# Patient Record
Sex: FEMALE | Race: WHITE | URBAN - METROPOLITAN AREA
[De-identification: names, ages, dates, MRNs, and addresses within clinical notes are randomized per-mention and may not be internally consistent; named-entity substitution may affect disease eponyms.]

---

## 2017-11-09 ENCOUNTER — OFFICE VISIT (OUTPATIENT)
Dept: DERMATOLOGY | Facility: AMBULATORY SURGERY CENTER | Age: 62
End: 2017-11-09

## 2017-11-09 VITALS
HEIGHT: 61 IN | HEART RATE: 77 BPM | TEMPERATURE: 98 F | SYSTOLIC BLOOD PRESSURE: 120 MMHG | WEIGHT: 172 LBS | BODY MASS INDEX: 32.47 KG/M2 | OXYGEN SATURATION: 96 % | RESPIRATION RATE: 16 BRPM | DIASTOLIC BLOOD PRESSURE: 78 MMHG

## 2017-11-09 DIAGNOSIS — L57.0 ACTINIC KERATOSIS: Primary | ICD-10-CM

## 2017-11-09 DIAGNOSIS — Z85.828 HISTORY OF NONMELANOMA SKIN CANCER: ICD-10-CM

## 2017-11-09 RX ORDER — INSULIN LISPRO 100 [IU]/ML
INJECTION, SOLUTION INTRAVENOUS; SUBCUTANEOUS
COMMUNITY
Start: 2017-09-05

## 2017-11-09 RX ORDER — ATORVASTATIN CALCIUM 40 MG/1
40 TABLET, FILM COATED ORAL DAILY
COMMUNITY
Start: 2017-09-04

## 2017-11-09 RX ORDER — INSULIN GLARGINE 100 [IU]/ML
INJECTION, SOLUTION SUBCUTANEOUS
COMMUNITY
Start: 2017-09-05

## 2017-11-09 RX ORDER — SPIRONOLACTONE 50 MG/1
TABLET, FILM COATED ORAL
COMMUNITY
Start: 2017-11-01

## 2017-11-09 RX ORDER — METOPROLOL TARTRATE 25 MG/1
25 TABLET, FILM COATED ORAL 2 TIMES DAILY
COMMUNITY
Start: 2017-09-04

## 2017-11-09 RX ORDER — DILTIAZEM HYDROCHLORIDE 180 MG/1
CAPSULE, EXTENDED RELEASE ORAL
COMMUNITY
Start: 2017-11-01

## 2017-11-09 RX ORDER — FLUOROURACIL 50 MG/G
CREAM TOPICAL 2 TIMES DAILY
Qty: 40 G | Refills: 0 | Status: SHIPPED | OUTPATIENT
Start: 2017-11-09 | End: 2019-11-19 | Stop reason: CLARIF

## 2017-11-09 RX ORDER — INSULIN HUMAN 500 [IU]/ML
INJECTION, SOLUTION SUBCUTANEOUS
Refills: 5 | COMMUNITY
Start: 2017-11-03

## 2017-11-09 NOTE — MR AVS SNAPSHOT
Visit Information Date & Time Provider Department Dept. Phone Encounter #  
 11/9/2017 11:30 AM Alan Rice NP [facility] [phone] [id number] Upcoming Health Maintenance Date Due Hepatitis C Screening 1955 DTaP/Tdap/Td series (1 - Tdap) 1/18/1974 PAP AKA CERVICAL CYTOLOGY 1/18/1974 BREAST CANCER SCRN MAMMOGRAM 1/18/2003 FOBT Q 1 YEAR AGE 50-75 1/18/2003 ZOSTER VACCINE AGE 60> 11/18/2012 Influenza Age 5 to Adult 8/1/2017 Allergies as of 11/9/2017  Review Complete On: 11/9/2017 By: Fátima Richards Severity Noted Reaction Type Reactions Insulin Beef  09/25/2012    Unknown (comments) Macrodantin [Nitrofurantoin Macrocrystalline]  09/25/2012    Rash Valium [Diazepam]  09/25/2012    Unknown (comments) Current Immunizations  Never Reviewed No immunizations on file. Not reviewed this visit Vitals BP Pulse Temp Resp Height(growth percentile) Weight(growth percentile) 120/78 (BP 1 Location: Left arm, BP Patient Position: Sitting) 77 98 °F (36.7 °C) (Oral) 16 5' 1\" (1.549 m) 172 lb (78 kg) SpO2 BMI OB Status Smoking Status 96% 32.5 kg/m2 Hysterectomy Never Smoker Vitals History BMI and BSA Data Body Mass Index Body Surface Area 32.5 kg/m 2 1.83 m 2 Preferred Pharmacy Pharmacy Name Phone [address] [facility] [phone] Your Updated Medication List  
  
   
This list is accurate as of: 11/9/17 11:57 AM.  Always use your most recent med list.  
  
  
  
  
 atorvastatin 40 mg tablet Commonly known as:  LIPITOR Take 40 mg by mouth daily. BD INSULIN SYRINGE ULTRA-FINE 1/2 mL 31 gauge x 15/64\" Syrg Generic drug:  insulin syringe-needle U-100 * CARTIA  mg ER capsule Generic drug:  dilTIAZem CD Take  by mouth daily. * CARTIA  mg ER capsule Generic drug:  dilTIAZem CD  
  
 FEMARA 2.5 mg tablet Generic drug:  letrozole Take 2.5 mg by mouth daily. FISH OIL PO Take  by mouth. FOSAMAX 70 mg tablet Generic drug:  alendronate Take  by mouth.  
  
 glimepiride 4 mg tablet Commonly known as:  AMARYL Take  by mouth every morning. HumaLOG KwikPen 100 unit/mL kwikpen Generic drug:  insulin lispro HumaLOG Mix 75-25 100 unit/mL (75-25) injection Generic drug:  insulin lispro protamine/insulin lispro  
by SubCUTAneous route. HumuLIN R U-500 (Conc) Kwikpen 500 unit/mL (3 mL) Inpn subQ pen Generic drug:  insulin U-500 CONCENTRATED regular INJECT 90 UNITS WITH BREAKFAST AND 80 UNITS WITH LUNCH  
  
 LANTUS 100 unit/mL injection Generic drug:  insulin glargine LISINOPRIL-HYDROCHLOROTHIAZIDE PO Take  by mouth.  
  
 metFORMIN 500 mg tablet Commonly known as:  GLUCOPHAGE Take  by mouth daily. METOPROLOL SUCCINATE PO Take  by mouth.  
  
 metoprolol tartrate 25 mg tablet Commonly known as:  LOPRESSOR Take 25 mg by mouth two (2) times a day. MUCINEX DM PO Take  by mouth.  
  
 multivitamin tablet Commonly known as:  ONE A DAY Take 1 Tab by mouth daily. POTASSIUM PO Take  by mouth. * spironolactone 25 mg tablet Commonly known as:  ALDACTONE Take 25 mg by mouth daily. * spironolactone 50 mg tablet Commonly known as:  ALDACTONE  
  
 * Notice: This list has 4 medication(s) that are the same as other medications prescribed for you. Read the directions carefully, and ask your doctor or other care provider to review them with you. Patient Instructions Self Skin Exam and Sunscreens Early detection and treatment is essential in the treatment of all forms of skin cancer. If caught early, all forms of skin cancer are curable.   In addition to your regular visits, you should perform a monthly skin examination. Over time, you become familiar with what is normally found on your skin and can identify new or suspicious spots. One of the screening tools you can use to assess your skin is to follow the ABCDEs: 
 
A= Asymmetry (One half is unlike the other half) B= Border (An irregular, scalloped or poorly defined edge) C= Color (Is varied from one area to another, has shades of tan, brown/ black,       white, red or blue) D= Diameter (Spots larger than 6mm or a pencil eraser) E= Evolving (New spots or one that is changing in size, shape, or color) A follow- up interval will be customized based on your history of skin cancer or level of skin damage and risk factors. In any case, if you notice a suspicious or new spot, an appointment should be arranged between regular visits. Everyone should use sunscreen and sun-safe practices, which is especially important for those with a personal or family history of skin cancer. Suggestions for this include: 1. Use daily moisturizers containing SPF 30 or higher. 2. Wear long sleeve clothing with UPF ratings and a broad-brimmed hat. 3. Apply sunscreen with SPF 30 or higher to all sun exposed areas if you are going to be in the sun. A broad spectrum UVA/ UVB sunscreen is best.  Dont forget to REAPPLY every two hours or more often if swimming or sweating! 4. Avoid outside activities during peak sun hours, especially in the summer (10am- 2pm). 5. DO NOT use tanning beds. Using sunscreen and sun-safe practices can help reduce the likelihood of developing skin cancer or additional skin cancers in those previously diagnosed. Introducing [facility]! Dayana Child introduces [facility] patient portal. Now you can access parts of your medical record, email your doctor's office, and request medication refills online. 1. In your internet browser, go to [email or web address] 2. Click on the First Time User? Click Here link in the Sign In box. You will see the New Member Sign Up page. 3. Enter your Kindful Access Code exactly as it appears below. You will not need to use this code after youve completed the sign-up process. If you do not sign up before the expiration date, you must request a new code. · Kindful Access Code: [id number] [facility] Expires: 2/7/2018 11:57 AM 
 
4. Enter the last four digits of your Social Security Number (xxxx) and Date of Birth (mm/dd/yyyy) as indicated and click Submit. You will be taken to the next sign-up page. 5. Create a Kindful ID. This will be your Kindful login ID and cannot be changed, so think of one that is secure and easy to remember. 6. Create a Kindful password. You can change your password at any time. 7. Enter your Password Reset Question and Answer. This can be used at a later time if you forget your password. 8. Enter your e-mail address. You will receive e-mail notification when new information is available in [address] 9. Click Sign Up. You can now view and download portions of your medical record. 10. Click the Download Summary menu link to download a portable copy of your medical information. If you have questions, please visit the Frequently Asked Questions section of the Kindful website. Remember, Kindful is NOT to be used for urgent needs. For medical emergencies, dial 911. Now available from your iPhone and Android! Please provide this summary of care documentation to your next provider. Your primary care clinician is listed as Apoorva Mujica. If you have any questions after today's visit, please call [phone].

## 2017-11-09 NOTE — PROGRESS NOTES
Chief Complaint   Patient presents with    Skin Exam     1. Have you been to the ER, urgent care clinic since your last visit? Hospitalized since your last visit? No    2. Have you seen or consulted any other health care providers outside of the [address] [place] [address] since your last visit? Include any pap smears or colon screening. Yes, Dr. Zohra Read for a routine check up and Dr. Clau Reid for diabetes.

## 2017-11-09 NOTE — PROGRESS NOTES
Name: Patrick House       Age: 57 y.o. Date: 11/9/2017    Chief Complaint:   Chief Complaint   Patient presents with    Skin Exam       Subjective:    HPI  Ms. Patrick House is a 57 y.o. female who presents for a full skin exam.  The patient's last skin exam was one year ago and the patient does have current complaints related to her skin. She reports dry scaly areas diffusely on her face. One resolved with a mask she used but the others have not. The patient's pertinent skin history includes : SCC of the left upper lip, BCC of the right ala and arm    ROS: Constitutional: Negative. Dermatological : positive for - skin lesion changes      Social History     Social History    Marital status: UNKNOWN     Spouse name: N/A    Number of children: N/A    Years of education: N/A     Occupational History    Not on file. Social History Main Topics    Smoking status: Never Smoker    Smokeless tobacco: Never Used    Alcohol use No    Drug use: Not on file    Sexual activity: Not on file     Other Topics Concern    Not on file     Social History Narrative       Family History   Problem Relation Age of Onset    Other Mother     Heart Disease Father     Cancer Sister     Diabetes Brother        Past Medical History:   Diagnosis Date    Basal cell carcinoma     Endocrine disease     Essential hypertension     Family history of skin cancer     Skin cancer     Squamous cell carcinoma     Sun-damaged skin     Sunburn, blistering        Past Surgical History:   Procedure Laterality Date    AMB POC MOHS 1 STAGE H/N/HF/G      HX CERVICAL FUSION  2000    HX HYSTERECTOMY  2004    HX MASTECTOMY  2009    bilateral    HX MOHS PROCEDURES  1999       Current Outpatient Prescriptions   Medication Sig Dispense Refill    atorvastatin (LIPITOR) 40 mg tablet Take 40 mg by mouth daily.       HUMULIN R U-500, CONC, KWIKPEN 500 unit/mL (3 mL) inpn subQ pen INJECT 90 UNITS WITH BREAKFAST AND 80 UNITS WITH LUNCH  5    metoprolol tartrate (LOPRESSOR) 25 mg tablet Take 25 mg by mouth two (2) times a day.  fluorouracil (EFUDEX) 5 % chemo cream Apply  to affected area two (2) times a day. 40 g 0    spironolactone (ALDACTONE) 25 mg tablet Take 25 mg by mouth daily.  metFORMIN (GLUCOPHAGE) 500 mg tablet Take  by mouth daily.  DOCOSAHEXANOIC ACID/EPA (FISH OIL PO) Take  by mouth.  multivitamin (ONE A DAY) tablet Take 1 Tab by mouth daily.  CARTIA  mg ER capsule       LANTUS 100 unit/mL injection       HUMALOG KWIKPEN 100 unit/mL kwikpen       spironolactone (ALDACTONE) 50 mg tablet       BD INSULIN SYRINGE ULTRA-FINE 1/2 mL 31 gauge x 15/64\" syrg       METOPROLOL SUCCINATE PO Take  by mouth.  insulin lispro protamine/insulin lispro (HUMALOG MIX 75-25) 100 unit/mL (75-25) injection by SubCUTAneous route.  diltiazem CD (CARTIA XT) 120 mg ER capsule Take  by mouth daily.  GUAIFENESIN/DEXTROMETHORPHAN (MUCINEX DM PO) Take  by mouth.  letrozole (FEMARA) 2.5 mg tablet Take 2.5 mg by mouth daily.  alendronate (FOSAMAX) 70 mg tablet Take  by mouth.  LISINOPRIL-HYDROCHLOROTHIAZIDE PO Take  by mouth.  glimepiride (AMARYL) 4 mg tablet Take  by mouth every morning.  POTASSIUM PO Take  by mouth. Allergies   Allergen Reactions    Insulin Beef Unknown (comments)    Macrodantin [Nitrofurantoin Macrocrystalline] Rash    Valium [Diazepam] Unknown (comments)         Objective:    Visit Vitals    /78 (BP 1 Location: Left arm, BP Patient Position: Sitting)    Pulse 77    Temp 98 °F (36.7 °C) (Oral)    Resp 16    Ht 5' 1\" (1.549 m)    Wt 78 kg (172 lb)    SpO2 96%    BMI 32.5 kg/m2       Christian Layman is a 57 y.o. female who appears well and in no distress. She is awake, alert, and oriented. There is no preauricular, submandibular, or cervical lymphadenopathy.   A skin examination was performed including her scalp, face (including eyelids), ears, neck, chest, back, abdomen, upper extremities (including digits/nails), lower extremities, breasts, buttocks; genital skin was not examined. She has lentigines on sun exposed areas. There are diffuse but some broad actinic keratoses on the face. There are scattered stuck on waxy macules and keratotic papules consistent with SKs. She has few nevi - benign in appearance. There are scattered cherry angiomas. She has well healed scars on the left ala and left upper cutaneous lip as well as the arm without evidence of lesion recurrence. Assessment/Plan:  1. Actinic keratoses. The diagnosis discussed and she agrees to use 5-Fu for slow course but 4 weeks of therapy. 2.Solar lentigos. The diagnosis and relationship to sun exposure was reviewed. Sun protection advised. 3. Personal history of skin cancer. I discussed sun protection, sunscreen use, the warning signs of skin cancer, the need for self-skin examinations, and the need for regular practitioner exams every 1 year. The patient should follow up sooner as needed if new, changing, or symptomatic skin lesions arise. 4.Seborrheic keratoses. The diagnosis was reviewed and the patient was reassured that no treatment is needed for these benign lesions. 5.Cherry angiomas. The diagnosis was reviewed and the patient was reassured that no treatment is needed for these benign lesions. 6.Normal nevi. The diagnosis of normal nevi was reviewed. I discussed sun protection, sunscreen use, the warning signs of skin cancer, the need for self-skin examinations, and the need for regular practitioner exams every 1 year. The patient should follow up sooner as needed if new, changing, or symptomatic skin lesions arise.

## 2017-12-05 ENCOUNTER — TELEPHONE (OUTPATIENT)
Dept: DERMATOLOGY | Facility: AMBULATORY SURGERY CENTER | Age: 62
End: 2017-12-05

## 2018-11-14 ENCOUNTER — OFFICE VISIT (OUTPATIENT)
Dept: DERMATOLOGY | Facility: AMBULATORY SURGERY CENTER | Age: 63
End: 2018-11-14

## 2018-11-14 VITALS
OXYGEN SATURATION: 98 % | HEART RATE: 78 BPM | BODY MASS INDEX: 32.47 KG/M2 | SYSTOLIC BLOOD PRESSURE: 122 MMHG | HEIGHT: 61 IN | RESPIRATION RATE: 15 BRPM | WEIGHT: 172 LBS | DIASTOLIC BLOOD PRESSURE: 60 MMHG

## 2018-11-14 DIAGNOSIS — Z85.828 HISTORY OF NONMELANOMA SKIN CANCER: ICD-10-CM

## 2018-11-14 DIAGNOSIS — L82.1 SEBORRHEIC KERATOSES: ICD-10-CM

## 2018-11-14 DIAGNOSIS — D18.01 CHERRY ANGIOMA: ICD-10-CM

## 2018-11-14 DIAGNOSIS — L81.4 LENTIGINES: ICD-10-CM

## 2018-11-14 DIAGNOSIS — Z12.83 SCREENING FOR MALIGNANT NEOPLASM OF SKIN: ICD-10-CM

## 2018-11-14 DIAGNOSIS — L57.0 ACTINIC KERATOSES: Primary | ICD-10-CM

## 2018-11-14 DIAGNOSIS — D22.9 MULTIPLE BENIGN NEVI: ICD-10-CM

## 2018-11-14 NOTE — PROGRESS NOTES
Written by Candace Rendon, as dictated by Clayton Velarde Ala, [address]. Name: Urbano Coburn       Age: 70 y.o. Date: 11/14/2018    Chief Complaint:   Chief Complaint   Patient presents with    Skin Exam     full skin exam-still using efudex cream        Subjective:    HPI  Ms. Urbano Coburn is a 70 y.o. female who presents for a full skin exam.  The patient's last skin exam was on 11/09/17 and the patient does have current complaints related to her skin. She reports small  bumps on her right eyebrow that she would like evaluated. She also notes a lentigo that has become brown and raised on her left upper arm, she is concerned this could be skin cancer. She states she has used the 5-FU cream on her entire face and ears, which reacted with bleeding and redness all over her face in December (used 6 weeks). She restarted 5 weeks ago to treat some other suspicious areas. She states her face is still slightly pink, sore, and swollen. She is still using the cream on the corners of her nose and mouth- this is the 5th week of treatment in these areas. She uses Cetaphil body wash on her face. She is feeling well and in her usual state of health today. She has no current illnesses, no other skin concerns. Her allergies, medications, medical, and social history are reviewed by me today. The patient's pertinent skin history includes : SCC of the left upper lip, BCC of the right ala and arm    ROS: Constitutional: Negative.     Dermatological : positive for - skin lesion changes    Social History     Socioeconomic History    Marital status: UNKNOWN     Spouse name: Not on file    Number of children: Not on file    Years of education: Not on file    Highest education level: Not on file   Social Needs    Financial resource strain: Not on file    Food insecurity - worry: Not on file    Food insecurity - inability: Not on file    Transportation needs - medical: Not on file   Spyder Lynk needs - non-medical: Not on file   Occupational History    Not on file   Tobacco Use    Smoking status: Never Smoker    Smokeless tobacco: Never Used   Substance and Sexual Activity    Alcohol use: No    Drug use: Not on file    Sexual activity: Not on file   Other Topics Concern    Not on file   Social History Narrative    Not on file       Family History   Problem Relation Age of Onset    Other Mother     Heart Disease Father     Cancer Sister     Diabetes Brother        Past Medical History:   Diagnosis Date    Basal cell carcinoma     Endocrine disease     Essential hypertension     Family history of skin cancer     Skin cancer     Squamous cell carcinoma     Sun-damaged skin     Sunburn, blistering        Past Surgical History:   Procedure Laterality Date    AMB POC MOHS 1 STAGE H/N/HF/G      HX CERVICAL FUSION  2000    HX HYSTERECTOMY  2004    HX MASTECTOMY  2009    bilateral    HX MOHS PROCEDURES  1999       Current Outpatient Medications   Medication Sig Dispense Refill    CARTIA  mg ER capsule       HUMALOG KWIKPEN 100 unit/mL kwikpen       HUMULIN R U-500, CONC, KWIKPEN 500 unit/mL (3 mL) inpn subQ pen INJECT 90 UNITS WITH BREAKFAST AND 80 UNITS WITH LUNCH  5    metoprolol tartrate (LOPRESSOR) 25 mg tablet Take 25 mg by mouth two (2) times a day.  spironolactone (ALDACTONE) 50 mg tablet       BD INSULIN SYRINGE ULTRA-FINE 1/2 mL 31 gauge x 15/64\" syrg       fluorouracil (EFUDEX) 5 % chemo cream Apply  to affected area two (2) times a day. 40 g 0    METOPROLOL SUCCINATE PO Take  by mouth.  diltiazem CD (CARTIA XT) 120 mg ER capsule Take  by mouth daily.  spironolactone (ALDACTONE) 25 mg tablet Take 25 mg by mouth daily.  LISINOPRIL-HYDROCHLOROTHIAZIDE PO Take  by mouth.  POTASSIUM PO Take  by mouth.  DOCOSAHEXANOIC ACID/EPA (FISH OIL PO) Take  by mouth.  multivitamin (ONE A DAY) tablet Take 1 Tab by mouth daily.         atorvastatin (LIPITOR) 40 mg tablet Take 40 mg by mouth daily.  LANTUS 100 unit/mL injection       insulin lispro protamine/insulin lispro (HUMALOG MIX 75-25) 100 unit/mL (75-25) injection by SubCUTAneous route.  GUAIFENESIN/DEXTROMETHORPHAN (MUCINEX DM PO) Take  by mouth.  letrozole (FEMARA) 2.5 mg tablet Take 2.5 mg by mouth daily.  alendronate (FOSAMAX) 70 mg tablet Take  by mouth.  metFORMIN (GLUCOPHAGE) 500 mg tablet Take  by mouth daily.  glimepiride (AMARYL) 4 mg tablet Take  by mouth every morning. Allergies   Allergen Reactions    Insulin Beef Unknown (comments)    Macrodantin [Nitrofurantoin Macrocrystalline] Rash    Valium [Diazepam] Unknown (comments)         Objective:    Visit Vitals  /60 (BP 1 Location: Left arm, BP Patient Position: Sitting)   Pulse 78   Resp 15   Ht 5' 1\" (1.549 m)   Wt 172 lb (78 kg)   SpO2 98%   BMI 32.50 kg/m²       Teri Acosta is a 70 y.o. female who appears well and in no distress. She is awake, alert, and oriented. There is no preauricular, submandibular, or cervical lymphadenopathy. A skin examination was performed including her scalp, face (including eyelids), ears, neck, chest, back, abdomen, upper extremities (including digits/nails), lower extremities, breasts, buttocks; genital skin was not examined. She has a well-healed scars on her left upper lip, right ala, and right upper arm without evidence of lesion recurrence. There are lentigines on sun exposed areas. She has thin-scaled actinic keratoses on her right forearm x4 and chest x1. Her face and ears is much improved from her last visit after using the 5-FU cream. She has scattered waxy macules and keratotic papules consistent with seborrheic keratoses, including the lesions of concern on her right eyebrow and left upper arm. She has scattered red papules consistent with cherry angiomas.  She has pink intradermal nevi and brown junctional nevi, no concerning features for severe atypia. Assessment/Plan:    1. Personal history nonmelanoma of skin cancer. I discussed sun protection, sunscreen use, the warning signs of skin cancer, the need for self-skin examinations, and the need for regular practitioner exams every 1 year. The patient should follow up sooner as needed if new, changing, or symptomatic skin lesions arise. 2. Actinic Keratoses. The diagnosis of this precancerous lesion related to sun exposure was reviewed. Verbal consent was obtained. I treated 5 lesions with cryotherapy and post-cryotherapy care was reviewed. 3. Solar lentigos. The diagnosis and relationship to sun exposure was reviewed. Sun protection advised. 4. Seborrheic keratoses. The diagnosis was reviewed and the patient was reassured that no treatment is needed for these benign lesions. 5. Cherry angiomas. The diagnosis was reviewed and the patient was reassured that no treatment is needed for these benign lesions. 6. Normal nevi. The diagnosis of normal nevi was reviewed. I discussed sun protection, sunscreen use, the warning signs of skin cancer, the need for self-skin examinations, and the need for regular practitioner exams every 1 year. The patient should follow up sooner as needed if new, changing, or symptomatic skin lesions arise. I advised to stop use of the 5-FU cream on her face. She understands she has completed treatment. This plan was reviewed with the patient and patient agrees. All questions were answered. This scribe documentation was reviewed by me and accurately reflects the examination and decisions made by me. [facility]   OFFICE PROCEDURE PROGRESS NOTE   Chart reviewed for the following:   Amrit PASCUAL, have reviewed the History, Physical and updated the Allergic reactions for Vinnie Powers. TIME OUT performed immediately prior to start of procedure:   IYasemin, have performed the following reviews on Yolie Maurice   prior to the start of the procedure:     * Patient was identified by name and date of birth   * Agreement on procedure being performed was verified   * Risks and Benefits explained to the patient   * Procedure site verified and marked as necessary   * Patient was positioned for comfort   * Consent was signed and verified     Time: 11:45 AM  Date of procedure: 11/14/2018  Procedure performed by: Isidro Denise.  Darwin Mohan DNP  Provider assisted by: self   Patient assisted by: self   How tolerated by patient: tolerated the procedure well with no complications   Comments: none

## 2019-11-19 ENCOUNTER — HOSPITAL ENCOUNTER (OUTPATIENT)
Dept: LAB | Age: 64
Discharge: HOME | End: 2019-11-19

## 2019-11-19 ENCOUNTER — OFFICE VISIT (OUTPATIENT)
Dept: DERMATOLOGY | Facility: AMBULATORY SURGERY CENTER | Age: 64
End: 2019-11-19

## 2019-11-19 VITALS
HEART RATE: 76 BPM | WEIGHT: 170 LBS | SYSTOLIC BLOOD PRESSURE: 122 MMHG | TEMPERATURE: 98.9 F | RESPIRATION RATE: 16 BRPM | BODY MASS INDEX: 32.1 KG/M2 | OXYGEN SATURATION: 97 % | HEIGHT: 61 IN | DIASTOLIC BLOOD PRESSURE: 60 MMHG

## 2019-11-19 DIAGNOSIS — L82.0 INFLAMED SEBORRHEIC KERATOSIS: ICD-10-CM

## 2019-11-19 DIAGNOSIS — L57.0 ACTINIC KERATOSES: ICD-10-CM

## 2019-11-19 DIAGNOSIS — D22.9 MULTIPLE BENIGN NEVI: ICD-10-CM

## 2019-11-19 DIAGNOSIS — D48.5 NEOPLASM OF UNCERTAIN BEHAVIOR OF SKIN OF LIP: ICD-10-CM

## 2019-11-19 DIAGNOSIS — L81.4 LENTIGINES: ICD-10-CM

## 2019-11-19 DIAGNOSIS — D48.5 NEOPLASM OF UNCERTAIN BEHAVIOR OF SKIN OF SHOULDER: ICD-10-CM

## 2019-11-19 DIAGNOSIS — Z85.828 HISTORY OF NONMELANOMA SKIN CANCER: Primary | ICD-10-CM

## 2019-11-19 DIAGNOSIS — D18.01 CHERRY ANGIOMA: ICD-10-CM

## 2019-11-19 DIAGNOSIS — L82.1 SEBORRHEIC KERATOSES: ICD-10-CM

## 2019-11-19 PROCEDURE — 88341 IMHCHEM/IMCYTCHM EA ADD ANTB: CPT

## 2019-11-19 RX ORDER — FLUOROURACIL 50 MG/G
CREAM TOPICAL 2 TIMES DAILY
Qty: 40 G | Refills: 0 | Status: SHIPPED | OUTPATIENT
Start: 2019-11-19

## 2019-11-19 NOTE — PROGRESS NOTES
Room: 6    Identified pt with two pt identifiers(name and ). Reviewed record in preparation for visit and have obtained necessary documentation. All patient medications has been reviewed. Chief Complaint   Patient presents with    Skin Exam     Full body skin exam       Health Maintenance Due   Topic    Hepatitis C Screening     DTaP/Tdap/Td series (1 - Tdap)    Shingrix Vaccine Age 50> (1 of 2)    BREAST CANCER SCRN MAMMOGRAM     FOBT Q 1 YEAR AGE 50-75     GLAUCOMA SCREENING Q2Y     Bone Densitometry (Dexa) Screening     MEDICARE YEARLY EXAM     Influenza Age 5 to Adult        Vitals:    19 1153   BP: 122/60   Pulse: 76   Resp: 16   Temp: 98.9 °F (37.2 °C)   TempSrc: Oral   SpO2: 97%   Weight: 77.1 kg (170 lb)   Height: 5' 1\" (1.549 m)   PainSc:   0 - No pain       1. Have you been to the ER, urgent care clinic since your last visit? Hospitalized since your last visit? No    2. Have you seen or consulted any other health care providers outside of the [address] [place] [address] since your last visit? Include any pap smears or colon screening. No    Patient is accompanied by self I have received verbal consent from Samreen Lyn to discuss any/all medical information while they are present in the room.

## 2019-11-19 NOTE — PROGRESS NOTES
Written by Ghazal Stevenson, as dictated by Cosme Green Batch, [address]. Name: Gama Angel       Age: 75 y.o. Date: 11/19/2019    Chief Complaint: No chief complaint on file. Subjective:    HPI  Ms. Gama Angel is a 75 y.o. female who presents for a full skin exam.  The patient's last skin exam was on 11/14/18 and the patient does have current complaints related to her skin. The patient reports that she has had significant improvement in the skin on her face after applying Efudex to her face. She reports that she had 2 lesions appear on the right forehead near the eyebrow after using Efudex. She reports applying Frankensen's oil without significant improvement. She says the lesions do not hurt but they just feel irritated. She is concerned about a lesion on the left upper arm that she thinks is getting bigger. The patient also reports scaly areas across on her lips. She is feeling well and in her usual state of health today. She has no current illnesses, no other skin concerns. Her allergies, medications, medical, and social history are reviewed by me today. The patient's pertinent skin history includes : Personal history of NMSC  -BCC, right upper arm, curettage, 2010 (prior to first visit)  -SCC, left upper lip, Mohs, 2005 (prior to first visit)   -BCC, left ala, Mohs, 2002 (prior to first visit)    ROS: Constitutional: Negative.     Dermatological : positive for - skin lesion changes    Social History     Socioeconomic History    Marital status: UNKNOWN     Spouse name: Not on file    Number of children: Not on file    Years of education: Not on file    Highest education level: Not on file   Occupational History    Not on file   Social Needs    Financial resource strain: Not on file    Food insecurity:     Worry: Not on file     Inability: Not on file    Transportation needs:     Medical: Not on file     Non-medical: Not on file   Tobacco Use    Smoking status: Never Smoker    Smokeless tobacco: Never Used   Substance and Sexual Activity    Alcohol use: Yes     Comment: occ    Drug use: Not on file    Sexual activity: Not on file   Lifestyle    Physical activity:     Days per week: Not on file     Minutes per session: Not on file    Stress: Not on file   Relationships    Social connections:     Talks on phone: Not on file     Gets together: Not on file     Attends Hindu service: Not on file     Active member of club or organization: Not on file     Attends meetings of clubs or organizations: Not on file     Relationship status: Not on file    Intimate partner violence:     Fear of current or ex partner: Not on file     Emotionally abused: Not on file     Physically abused: Not on file     Forced sexual activity: Not on file   Other Topics Concern    Not on file   Social History Narrative    Not on file       Family History   Problem Relation Age of Onset    Other Mother     Heart Disease Father     Cancer Sister     Diabetes Brother        Past Medical History:   Diagnosis Date    Basal cell carcinoma     Endocrine disease     Essential hypertension     Family history of skin cancer     Skin cancer     Squamous cell carcinoma     Sun-damaged skin     Sunburn, blistering        Past Surgical History:   Procedure Laterality Date    AMB POC MOHS 1 STAGE H/N/HF/G      HX CATARACT REMOVAL Bilateral     summer 2019     HX CERVICAL FUSION  2000    HX HYSTERECTOMY  2004    HX MASTECTOMY  2009    bilateral    HX MOHS PROCEDURES  1999       Current Outpatient Medications   Medication Sig Dispense Refill    fluorouracil (EFUDEX) 5 % chemo cream Apply  to affected area two (2) times a day. 40 g 0    atorvastatin (LIPITOR) 40 mg tablet Take 40 mg by mouth daily.  HUMULIN R U-500, CONC, KWIKPEN 500 unit/mL (3 mL) inpn subQ pen Indications: 130 units tid  5    metoprolol tartrate (LOPRESSOR) 25 mg tablet Take 25 mg by mouth two (2) times a day.       BD INSULIN SYRINGE ULTRA-FINE 1/2 mL 31 gauge x 15/64\" syrg       spironolactone (ALDACTONE) 25 mg tablet Take 12.5 mg by mouth daily.  metFORMIN (GLUCOPHAGE) 500 mg tablet Take  by mouth daily.  DOCOSAHEXANOIC ACID/EPA (FISH OIL PO) Take  by mouth.  multivitamin (ONE A DAY) tablet Take 1 Tab by mouth daily.  CARTIA  mg ER capsule       LANTUS 100 unit/mL injection       HUMALOG KWIKPEN 100 unit/mL kwikpen       spironolactone (ALDACTONE) 50 mg tablet       METOPROLOL SUCCINATE PO Take  by mouth.  insulin lispro protamine/insulin lispro (HUMALOG MIX 75-25) 100 unit/mL (75-25) injection by SubCUTAneous route.  diltiazem CD (CARTIA XT) 120 mg ER capsule Take  by mouth daily.  GUAIFENESIN/DEXTROMETHORPHAN (MUCINEX DM PO) Take  by mouth.  letrozole (FEMARA) 2.5 mg tablet Take 2.5 mg by mouth daily.  alendronate (FOSAMAX) 70 mg tablet Take  by mouth.  LISINOPRIL-HYDROCHLOROTHIAZIDE PO Take  by mouth.  glimepiride (AMARYL) 4 mg tablet Take  by mouth every morning.  POTASSIUM PO Take  by mouth. Allergies   Allergen Reactions    Insulin Beef Unknown (comments)    Macrodantin [Nitrofurantoin Macrocrystalline] Rash    Valium [Diazepam] Unknown (comments)         Objective:    Visit Vitals  /60 (BP 1 Location: Left arm, BP Patient Position: Sitting)   Pulse 76   Temp 98.9 °F (37.2 °C) (Oral)   Resp 16   Ht 5' 1\" (1.549 m)   Wt 77.1 kg (170 lb)   SpO2 97%   BMI 32.12 kg/m²       Izabella Tucker is a 75 y.o. female who appears well and in no distress. She is awake, alert, and oriented. A skin examination was performed including her scalp, face (including eyelids), ears, neck, chest, back, abdomen, upper extremities (including digits/nails), lower extremities, breasts, buttocks; genital skin was not examined. She has well healed scars on the right upper arm, left upper lip, and left ala without evidence of lesion recurrence. There are lentigines on sun exposed areas. She has scattered waxy macules and keratotic papules consistent with seborrheic keratoses - including the lesion of concern on the left arm. . She has scattered red papules consistent with cherry angiomas. She has pink intradermal nevi and brown junctional nevi, no concerning features for severe atypia. She has inflamed seborrheic keratoses on the right temple x 2, including the lesions of her concern. There is a 4 x 2 mm crusted pink papule on the right upper lip, favor SK, R/O SCC. There is a 6 x 4 mm irregular pink and brown macule on the left shoulder that favors a lentigo, R/O atypical pigmented lesion. She has thin scaled actinic keratoses on the lower lip at the vermilion cutaneous junction. Assessment/Plan:  1. Personal history of nonmelanoma skin cancer. I discussed sun protection, sunscreen use, the warning signs of skin cancer, the need for self-skin examinations, and the need for regular practitioner exams. The patient should follow up sooner as needed if new, changing, or symptomatic skin lesions arise. 2. Solar lentigos. The diagnosis and relationship to sun exposure was reviewed. Sun protection advised. 3. Seborrheic keratoses. The diagnosis was reviewed and the patient was reassured that no treatment is needed for these benign lesions. 4. Cherry angiomas. The diagnosis was reviewed and the patient was reassured that no treatment is needed for these benign lesions. 5. Normal nevi. The diagnosis of normal nevi was reviewed. I discussed sun protection, sunscreen use, the warning signs of skin cancer, mole monitoring, the need for self-skin examinations, and the need for regular practitioner exams. The patient should follow up sooner as needed if new, changing, or symptomatic skin lesions arise. 6. Neoplasm of Uncertain Behavior, right upper lip, favor SK, R/O SCC. The differential diagnoses were discussed.  A shave biopsy was advised to sample this lesion. The procedure was reviewed and verbal and written consent were obtained. The risks of pain, bleeding, infection, and scar were discussed. The patient is aware that this is a sample and is intended for diagnosis and not therapy of the skin lesion. I performed the procedure. The site was cleansed and anesthetized with 1% Lidocaine with Epinephrine 1:100,000. A shave biopsy was performed to sample the lesion. Drysol was used for hemostasis. The wound was bandaged and care reviewed. The specimen was sent to pathology. I will contact the patient with the results and any further treatment that may be necessary. 7. Neoplasm of Uncertain Behavior, left shoulder, favor lentigo, R/O atypical pigmented lesion. The differential diagnoses were discussed. A shave biopsy was advised to sample this lesion. The procedure was reviewed and verbal and written consent were obtained. The risks of pain, bleeding, infection, and scar were discussed. The patient is aware that this is a sample and is intended for diagnosis and not therapy of the skin lesion. I performed the procedure. The site was cleansed and anesthetized with 1% Lidocaine with Epinephrine 1:100,000. A shave biopsy was performed to sample the lesion. Drysol was used for hemostasis. The wound was bandaged and care reviewed. The specimen was sent to pathology. I will contact the patient with the results and any further treatment that may be necessary. 8. Inflamed seborrheic keratoses. The diagnosis and treatment with liquid nitrogen cryotherapy were reviewed. The risk or persistence or recurrence of the keratosis and the potential for pigment change at the treated site were reviewed. Verbal consent was obtained. I treated 1 lesion with cryotherapy and care was reviewed. 9. Actinic Keratoses. The diagnosis of this precancerous lesion related to sun exposure was reviewed.   The patient was prescribed Efudex to apply to her lips BID for 2 weeks taking a break in the mid portion if needed. Next skin exam:  1 year    This plan was reviewed with the patient and patient agrees. All questions were answered. This scribe documentation was reviewed by me and accurately reflects the examination and decisions made by me. [facility]   OFFICE PROCEDURE PROGRESS NOTE   Chart reviewed for the following:   I, [place] Eliberto Dubin, have reviewed the History, Physical and updated the Allergic reactions for [place]. TIME OUT performed immediately prior to start of procedure:   I, Shelley B. Eliberto Dubin, have performed the following reviews on [place]   prior to the start of the procedure:     * Patient was identified by name and date of birth   * Agreement on procedure being performed was verified   * Risks and Benefits explained to the patient   * Procedure site verified and marked as necessary   * Patient was positioned for comfort   * Consent was signed and verified     Time: 12:20 pm  Date of procedure: 11/19/2019  Procedure performed by: Bebe Speed. Eliberto Dubin  Provider assisted by: Estela Bell LPN   Patient assisted by: self   How tolerated by patient: tolerated the procedure well with no complications   Comments: none

## 2019-11-25 DIAGNOSIS — D03.62 MELANOMA IN SITU OF LEFT SHOULDER (HCC): Primary | ICD-10-CM

## 2019-11-25 NOTE — PROGRESS NOTES
I left message on pt's voicemail per her request about her results. No further tx on the lip. MMi will need excision w/ margins. She would like to use Dr. Bhumika Lewis as discussed - I will forward records.